# Patient Record
Sex: FEMALE | Race: WHITE | NOT HISPANIC OR LATINO | URBAN - METROPOLITAN AREA
[De-identification: names, ages, dates, MRNs, and addresses within clinical notes are randomized per-mention and may not be internally consistent; named-entity substitution may affect disease eponyms.]

---

## 2018-03-12 ENCOUNTER — OUTPATIENT (OUTPATIENT)
Dept: OUTPATIENT SERVICES | Facility: HOSPITAL | Age: 53
LOS: 1 days | End: 2018-03-12

## 2018-03-12 ENCOUNTER — APPOINTMENT (OUTPATIENT)
Dept: NUCLEAR MEDICINE | Facility: CLINIC | Age: 53
End: 2018-03-12
Payer: COMMERCIAL

## 2018-03-12 DIAGNOSIS — Z00.8 ENCOUNTER FOR OTHER GENERAL EXAMINATION: ICD-10-CM

## 2018-03-12 PROCEDURE — 78815 PET IMAGE W/CT SKULL-THIGH: CPT | Mod: 26,PI

## 2018-04-17 ENCOUNTER — OUTPATIENT (OUTPATIENT)
Dept: OUTPATIENT SERVICES | Facility: HOSPITAL | Age: 53
LOS: 1 days | End: 2018-04-17
Payer: COMMERCIAL

## 2018-04-17 ENCOUNTER — RESULT REVIEW (OUTPATIENT)
Age: 53
End: 2018-04-17

## 2018-04-17 DIAGNOSIS — C55 MALIGNANT NEOPLASM OF UTERUS, PART UNSPECIFIED: ICD-10-CM

## 2018-04-17 LAB — PATHOLOGY CONSULT NOTE: SIGNIFICANT CHANGE UP

## 2018-04-17 PROCEDURE — 88321 CONSLTJ&REPRT SLD PREP ELSWR: CPT

## 2022-04-26 ENCOUNTER — APPOINTMENT (OUTPATIENT)
Age: 57
End: 2022-04-26
Payer: COMMERCIAL

## 2022-04-26 ENCOUNTER — RESULT CHARGE (OUTPATIENT)
Age: 57
End: 2022-04-26

## 2022-04-26 VITALS
HEART RATE: 86 BPM | OXYGEN SATURATION: 100 % | WEIGHT: 160 LBS | HEIGHT: 66 IN | BODY MASS INDEX: 25.71 KG/M2 | DIASTOLIC BLOOD PRESSURE: 84 MMHG | SYSTOLIC BLOOD PRESSURE: 133 MMHG

## 2022-04-26 DIAGNOSIS — Z82.49 FAMILY HISTORY OF ISCHEMIC HEART DISEASE AND OTHER DISEASES OF THE CIRCULATORY SYSTEM: ICD-10-CM

## 2022-04-26 DIAGNOSIS — R10.2 PELVIC AND PERINEAL PAIN: ICD-10-CM

## 2022-04-26 DIAGNOSIS — M62.838 OTHER MUSCLE SPASM: ICD-10-CM

## 2022-04-26 DIAGNOSIS — Z83.3 FAMILY HISTORY OF DIABETES MELLITUS: ICD-10-CM

## 2022-04-26 DIAGNOSIS — Z83.438 FAMILY HISTORY OF OTHER DISORDER OF LIPOPROTEIN METABOLISM AND OTHER LIPIDEMIA: ICD-10-CM

## 2022-04-26 DIAGNOSIS — N39.41 URGE INCONTINENCE: ICD-10-CM

## 2022-04-26 DIAGNOSIS — R39.15 URGENCY OF URINATION: ICD-10-CM

## 2022-04-26 LAB
BILIRUB UR QL STRIP: NEGATIVE
CLARITY UR: CLEAR
COLLECTION METHOD: NORMAL
GLUCOSE UR-MCNC: NEGATIVE
HCG UR QL: 0.2 EU/DL
HGB UR QL STRIP.AUTO: NEGATIVE
KETONES UR-MCNC: NEGATIVE
LEUKOCYTE ESTERASE UR QL STRIP: NEGATIVE
NITRITE UR QL STRIP: NEGATIVE
PH UR STRIP: 5
PROT UR STRIP-MCNC: NEGATIVE
SP GR UR STRIP: 1.01

## 2022-04-26 PROCEDURE — 81003 URINALYSIS AUTO W/O SCOPE: CPT | Mod: QW

## 2022-04-26 PROCEDURE — 51701 INSERT BLADDER CATHETER: CPT

## 2022-04-26 PROCEDURE — 99204 OFFICE O/P NEW MOD 45 MIN: CPT | Mod: 25

## 2022-04-26 RX ORDER — MULTIVIT-MIN/IRON/FOLIC ACID/K 18-600-40
CAPSULE ORAL
Refills: 0 | Status: ACTIVE | COMMUNITY

## 2022-04-26 RX ORDER — MULTIVITAMIN
TABLET ORAL
Refills: 0 | Status: ACTIVE | COMMUNITY

## 2022-04-26 RX ORDER — EZETIMIBE 10 MG/1
TABLET ORAL
Refills: 0 | Status: ACTIVE | COMMUNITY

## 2022-04-26 RX ORDER — BACLOFEN 10 MG/1
10 TABLET ORAL TWICE DAILY
Qty: 60 | Refills: 0 | Status: ACTIVE | COMMUNITY
Start: 2022-04-26 | End: 1900-01-01

## 2022-04-26 RX ORDER — METFORMIN HYDROCHLORIDE 625 MG/1
TABLET ORAL
Refills: 0 | Status: ACTIVE | COMMUNITY

## 2022-04-26 NOTE — REVIEW OF SYSTEMS
Received request via: Pharmacy    Was the patient seen in the last year in this department? Yes    Does the patient have an active prescription (recently filled or refills available) for medication(s) requested? No   [All Other ROS] : all other reviewed systems are negative

## 2022-04-26 NOTE — HISTORY OF PRESENT ILLNESS
[Cystocele (Obstetric)] : no [Uterine Prolapse] : no [Vaginal Wall Prolapse] : no [Rectal Prolapse] : no [Unable To Restrain Bowel Movement] : mild [x1] : nocturia once nightly [Feelings Of Urinary Urgency] : no [Pain During Urination (Dysuria)] : no [Urinary Tract Infection] : moderate [Constipation Obstructed Defecation] : no [Incomplete Emptying Of Stool] : no [Pelvic Pain] : no [Vaginal Pain] : moderate [Vulvar Pain] : no [] : no [FreeTextEntry1] : \par  57 y/o presents with reports of feeling like if she does not have a BM and if constipated will have back pain, she reports LARISSA/UUI, she reports frequency, nocturia X 1, she reports that she has been having pelvic pain, she has seen an orthopedic and had a MRI with no acute findings, she reports she is unsure if emptying, she reports the pain starts in the hip and goes towards back, she is unsure if she has a bulge, shes has 1 UTI with symptoms that were back pain, she reports that she needed two courses of abx, she reports feeling like something is wrong, she wakes up with back pain, if she gets up to soon she will leak urine, she reports that she also has DM2 which is well controlled, she does not feel constipation, \par \par \par h/o hysterectomy for fibroids (path with leiomyosarcoma) with subsequent trachelectomy

## 2022-04-26 NOTE — REASON FOR VISIT
[Initial Visit ___] : an initial visit for [unfilled] [Questionnaire Received] : Patient questionnaire received [Intake Form Reviewed] : Patient intake form with past medical history, surgical history, family history and social history reviewed today [Urine Frequency] : urine frequency [Pelvic Pain] : pelvic pain

## 2022-04-26 NOTE — PHYSICAL EXAM
[Chaperone Present] : A chaperone was present in the examining room during all aspects of the physical examination [No Acute Distress] : in no acute distress [Well developed] : well developed [Well Nourished] : ~L well nourished [Oriented x3] : oriented to person, place, and time [Normal Memory] : ~T memory was ~L unimpaired [Normal Mood/Affect] : mood and affect are normal [No Edema] : ~T edema was not present [None] : no CVA tenderness [Warm and Dry] : was warm and dry to touch [Normal Gait] : gait was normal [Labia Majora] : were normal [Labia Minora] : were normal [Atrophy] : atrophy [No Bleeding] : there was no active vaginal bleeding [2] : 2 [Absent] : absent [Normal] : no abnormalities [Post Void Residual ____ml] : post void residual was [unfilled] ml [Cough] : no cough [Tenderness] : ~T no ~M abdominal tenderness observed [Distended] : not distended [Inguinal LAD] : no adenopathy was noted in the inguinal lymph nodes [de-identified] : no POP [FreeTextEntry4] : bilateral levator spasm

## 2022-04-26 NOTE — PROCEDURE
[Straight Catheterization] : insertion of a straight catheter [Urinary Frequency] : urinary frequency [Patient] : the patient [___ Fr Straight Tip] : a [unfilled] in Armenian straight tip catheter [None] : there were no complications with the catheter insertion [Clear] : clear [No Complications] : no complications [Tolerated Well] : the patient tolerated the procedure well [Post procedure instructions and information given] : Post procedure instructions and information were given and reviewed with patient. [0] : 0

## 2022-04-26 NOTE — DISCUSSION/SUMMARY
[FreeTextEntry1] : \par Jacque presents for eval of pelvic/hip pain, h/o LMS, levator spasm. Based on her exams and symptoms we discussed pelvic floor dysfunction with significant levator spasm. We discussed management options including relaxation techniques including stress reduction, avoiding pushing and straining, aveeno oatmeal baths 15 minutes twice daily, and management of constipation. We discussed the importance of physical therapy. We discussed risks and benefits of baclofen and she will start. will get MRI due to history. All questions were answered.\par \par [] PT\par [] MRI \par [] baclofen\par [] return in 2 months

## 2022-04-26 NOTE — OB HISTORY
[Vaginal ___] : [unfilled] vaginal delivery(s) [Definite ___ (Date)] : the last menstrual period was [unfilled] [Last Pap Smear ___] : date of last pap smear was on [unfilled] [Sexually Active] : sexually active [Abnormal Pap Smear] : normal pap smear

## 2022-06-21 ENCOUNTER — APPOINTMENT (OUTPATIENT)
Age: 57
End: 2022-06-21